# Patient Record
Sex: FEMALE | Race: WHITE | URBAN - NONMETROPOLITAN AREA
[De-identification: names, ages, dates, MRNs, and addresses within clinical notes are randomized per-mention and may not be internally consistent; named-entity substitution may affect disease eponyms.]

---

## 2022-05-23 ENCOUNTER — WEB ENCOUNTER (OUTPATIENT)
Dept: URBAN - NONMETROPOLITAN AREA CLINIC 4 | Facility: CLINIC | Age: 77
End: 2022-05-23

## 2022-05-23 ENCOUNTER — LAB OUTSIDE AN ENCOUNTER (OUTPATIENT)
Dept: URBAN - NONMETROPOLITAN AREA CLINIC 4 | Facility: CLINIC | Age: 77
End: 2022-05-23

## 2022-05-23 ENCOUNTER — OFFICE VISIT (OUTPATIENT)
Dept: URBAN - NONMETROPOLITAN AREA CLINIC 4 | Facility: CLINIC | Age: 77
End: 2022-05-23
Payer: MEDICARE

## 2022-05-23 VITALS
DIASTOLIC BLOOD PRESSURE: 72 MMHG | BODY MASS INDEX: 25.49 KG/M2 | SYSTOLIC BLOOD PRESSURE: 129 MMHG | WEIGHT: 135 LBS | HEART RATE: 98 BPM | TEMPERATURE: 98.6 F | HEIGHT: 61 IN

## 2022-05-23 DIAGNOSIS — Z80.0 FAMILY HISTORY OF COLON CANCER IN MOTHER: ICD-10-CM

## 2022-05-23 DIAGNOSIS — Z79.1 PATIENT TAKES NSAID (NON-STEROID ANTI-INFLAMMATORY DRUG): ICD-10-CM

## 2022-05-23 DIAGNOSIS — R10.84 GENERALIZED ABDOMINAL PAIN: ICD-10-CM

## 2022-05-23 DIAGNOSIS — R19.5 DARK STOOLS: ICD-10-CM

## 2022-05-23 DIAGNOSIS — Z87.11 HISTORY OF STOMACH ULCERS: ICD-10-CM

## 2022-05-23 PROCEDURE — 99204 OFFICE O/P NEW MOD 45 MIN: CPT | Performed by: REGISTERED NURSE

## 2022-05-23 RX ORDER — FLUOXETINE HYDROCHLORIDE 10 MG/1
1 CAPSULE CAPSULE ORAL ONCE A DAY
Status: ACTIVE | COMMUNITY

## 2022-05-23 NOTE — PHYSICAL EXAM GASTROINTESTINAL
Abdomen , soft, moderate TTP across upper abdomen, nondistended , no guarding or rigidity , no masses palpable , normal bowel sounds , Liver and Spleen,  no hepatosplenomegaly , liver nontender

## 2022-05-23 NOTE — HPI-TODAY'S VISIT:
5/23/22: Pt is a 75 yo female with PMH of HTN, HLD, hypothyroidism, colon polyps, PUD, anxiety and depression who was referred by Dr. La Nena Jesus for evaluation of abdominal pain.   Pt reports acute onset of abdominal pain, SOB and weakness about 2 weeks ago. Pain is mostly across upper abdomen. She reports associated darks stools, bloating and heartburn. States everything tastes bad. Denies N/V, reflux, dysphagia, odynophagia, diarrhea, constipation or hematochezia. Has lost a total of 7 lbs unintentionally. She is currently taking Prilosec daily. She admits to taking Naprosen daily for torn meniscus in L knee and L shoulder. She is no longer taking this since symtpom onset.   Pt seen at [facility] twice in past week. Records reviewed. Labs, CT scan of chest and abd normal. CTA angio negative for clot.   FHX of CRC  (mother). Last EGD and cscope by Dr. Cadet. Diagnosed with PUD.   Son dx with prostate cancer 2-3 mths back

## 2022-05-26 ENCOUNTER — CLAIMS CREATED FROM THE CLAIM WINDOW (OUTPATIENT)
Dept: URBAN - METROPOLITAN AREA CLINIC 4 | Facility: CLINIC | Age: 77
End: 2022-05-26
Payer: MEDICARE

## 2022-05-26 ENCOUNTER — OFFICE VISIT (OUTPATIENT)
Dept: URBAN - METROPOLITAN AREA SURGERY CENTER 14 | Facility: SURGERY CENTER | Age: 77
End: 2022-05-26
Payer: MEDICARE

## 2022-05-26 ENCOUNTER — TELEPHONE ENCOUNTER (OUTPATIENT)
Dept: URBAN - METROPOLITAN AREA CLINIC 92 | Facility: CLINIC | Age: 77
End: 2022-05-26

## 2022-05-26 DIAGNOSIS — K31.89 FOCAL FOVEOLAR HYPERPLASIA: ICD-10-CM

## 2022-05-26 DIAGNOSIS — R10.13 ABDOMINAL DISCOMFORT, EPIGASTRIC: ICD-10-CM

## 2022-05-26 DIAGNOSIS — K31.89 ACQUIRED DEFORMITY OF DUODENUM: ICD-10-CM

## 2022-05-26 PROCEDURE — G8907 PT DOC NO EVENTS ON DISCHARG: HCPCS | Performed by: INTERNAL MEDICINE

## 2022-05-26 PROCEDURE — 43239 EGD BIOPSY SINGLE/MULTIPLE: CPT | Performed by: INTERNAL MEDICINE

## 2022-05-26 PROCEDURE — 88305 TISSUE EXAM BY PATHOLOGIST: CPT | Performed by: PATHOLOGY

## 2022-05-26 PROCEDURE — 88312 SPECIAL STAINS GROUP 1: CPT | Performed by: PATHOLOGY

## 2022-05-26 RX ORDER — SUCRALFATE 1 G
1 TABLET ON AN EMPTY STOMACH TABLET ORAL TWICE A DAY
Qty: 28 TABLET | Refills: 0 | OUTPATIENT
Start: 2022-05-26 | End: 2022-06-09

## 2022-05-26 RX ORDER — PANTOPRAZOLE SODIUM 40 MG/1
1 TABLET TABLET, DELAYED RELEASE ORAL TWICE A DAY
Qty: 60 TABLET | Refills: 0 | OUTPATIENT
Start: 2022-05-26

## 2022-05-26 RX ORDER — FLUOXETINE HYDROCHLORIDE 10 MG/1
1 CAPSULE CAPSULE ORAL ONCE A DAY
Status: ACTIVE | COMMUNITY

## 2022-06-09 ENCOUNTER — CLAIMS CREATED FROM THE CLAIM WINDOW (OUTPATIENT)
Dept: URBAN - METROPOLITAN AREA CLINIC 54 | Facility: CLINIC | Age: 77
End: 2022-06-09
Payer: MEDICARE

## 2022-06-09 ENCOUNTER — DASHBOARD ENCOUNTERS (OUTPATIENT)
Age: 77
End: 2022-06-09

## 2022-06-09 VITALS
TEMPERATURE: 97.1 F | DIASTOLIC BLOOD PRESSURE: 81 MMHG | WEIGHT: 132.4 LBS | HEART RATE: 75 BPM | HEIGHT: 61 IN | BODY MASS INDEX: 25 KG/M2 | SYSTOLIC BLOOD PRESSURE: 144 MMHG

## 2022-06-09 DIAGNOSIS — Z79.1 PATIENT TAKES NSAID (NON-STEROID ANTI-INFLAMMATORY DRUG): ICD-10-CM

## 2022-06-09 DIAGNOSIS — Z80.0 FAMILY HISTORY OF COLON CANCER IN MOTHER: ICD-10-CM

## 2022-06-09 DIAGNOSIS — Z87.11 HISTORY OF STOMACH ULCERS: ICD-10-CM

## 2022-06-09 DIAGNOSIS — R10.13 EPIGASTRIC PAIN: ICD-10-CM

## 2022-06-09 PROCEDURE — 99214 OFFICE O/P EST MOD 30 MIN: CPT

## 2022-06-09 RX ORDER — FLUOXETINE HYDROCHLORIDE 10 MG/1
1 CAPSULE CAPSULE ORAL ONCE A DAY
Status: ACTIVE | COMMUNITY

## 2022-06-09 RX ORDER — LEVOTHYROXINE SODIUM 125 UG/1
1 TABLET IN THE MORNING ON AN EMPTY STOMACH TABLET ORAL ONCE A DAY
Status: ACTIVE | COMMUNITY

## 2022-06-09 RX ORDER — LOVASTATIN 20 MG/1
1 TABLET WITH THE EVENING MEAL TABLET ORAL ONCE A DAY
Status: ACTIVE | COMMUNITY

## 2022-06-09 RX ORDER — PANTOPRAZOLE SODIUM 40 MG/1
1 TABLET TABLET, DELAYED RELEASE ORAL TWICE A DAY
Qty: 60 TABLET | Refills: 0 | Status: ACTIVE | COMMUNITY
Start: 2022-05-26

## 2022-06-09 RX ORDER — SUCRALFATE 1 G
1 TABLET ON AN EMPTY STOMACH TABLET ORAL TWICE A DAY
Qty: 28 TABLET | Refills: 0 | Status: ON HOLD | COMMUNITY
Start: 2022-05-26 | End: 2022-06-09

## 2022-06-09 RX ORDER — DICYCLOMINE HYDROCHLORIDE 20 MG/1
1 TABLET TABLET ORAL
Qty: 90 | Refills: 0 | OUTPATIENT
Start: 2022-06-09 | End: 2022-07-09

## 2022-06-09 NOTE — HPI-TODAY'S VISIT:
5/23/22: Pt is a 77 yo female with PMH of HTN, HLD, hypothyroidism, colon polyps, PUD, anxiety and depression who was referred by Dr. La Nena Jesus for evaluation of abdominal pain.   Pt reports acute onset of abdominal pain, SOB and weakness about 2 weeks ago. Pain is mostly across upper abdomen. She reports associated darks stools, bloating and heartburn. States everything tastes bad. Denies N/V, reflux, dysphagia, odynophagia, diarrhea, constipation or hematochezia. Has lost a total of 7 lbs unintentionally. She is currently taking Prilosec daily. She admits to taking Naprosen daily for torn meniscus in L knee and L shoulder. She is no longer taking this since symtpom onset.   Pt seen at [facility] twice in past week. Records reviewed. Labs, CT scan of chest and abd normal. CTA angio negative for clot.   FHX of CRC  (mother). Last EGD and cscope by Dr. Cadet. Diagnosed with PUD.   Son dx with prostate cancer 2-3 mths back   6/9/22 Follow up: Pt presents for f/u of EGD for abd pain. Pt states that she still has abd pain after eating a normal sized meal. Pain is crampy and across upper abd, worse in LUQ. Lasts about 3-4 hours without any alleviating factors. No particular trigger foods identified. No associated n/v, change in BMs, melena, hematochezia. Pt finished carafate today. Taking Protonix 40mg bid. Has been avoiding NSAIDs. Pending [facility] records for review of last cscope.  EGD 5/26/22: - Normal esophagus. - Z-line regular, 40 cm from the incisors. - Erythematous mucosa in the stomach. Biopsied. - Gastric diverticulum. - Normal examined duodenum. Biopsied.  Biopsy: No significant abnormality

## 2022-06-29 ENCOUNTER — OFFICE VISIT (OUTPATIENT)
Dept: URBAN - METROPOLITAN AREA CLINIC 53 | Facility: CLINIC | Age: 77
End: 2022-06-29

## 2022-07-25 ENCOUNTER — OFFICE VISIT (OUTPATIENT)
Dept: URBAN - METROPOLITAN AREA CLINIC 54 | Facility: CLINIC | Age: 77
End: 2022-07-25